# Patient Record
Sex: MALE | Race: BLACK OR AFRICAN AMERICAN | Employment: OTHER | URBAN - METROPOLITAN AREA
[De-identification: names, ages, dates, MRNs, and addresses within clinical notes are randomized per-mention and may not be internally consistent; named-entity substitution may affect disease eponyms.]

---

## 2018-09-25 ENCOUNTER — LAB ENCOUNTER (OUTPATIENT)
Dept: LAB | Age: 43
End: 2018-09-25
Attending: INTERNAL MEDICINE
Payer: COMMERCIAL

## 2018-09-25 ENCOUNTER — OFFICE VISIT (OUTPATIENT)
Dept: INTERNAL MEDICINE CLINIC | Facility: CLINIC | Age: 43
End: 2018-09-25
Payer: COMMERCIAL

## 2018-09-25 VITALS
SYSTOLIC BLOOD PRESSURE: 142 MMHG | TEMPERATURE: 99 F | DIASTOLIC BLOOD PRESSURE: 84 MMHG | WEIGHT: 209 LBS | HEIGHT: 66.25 IN | HEART RATE: 80 BPM | BODY MASS INDEX: 33.59 KG/M2

## 2018-09-25 DIAGNOSIS — E66.09 CLASS 1 OBESITY DUE TO EXCESS CALORIES WITHOUT SERIOUS COMORBIDITY WITH BODY MASS INDEX (BMI) OF 33.0 TO 33.9 IN ADULT: ICD-10-CM

## 2018-09-25 DIAGNOSIS — Z23 NEED FOR TETANUS, DIPHTHERIA, AND ACELLULAR PERTUSSIS (TDAP) VACCINE: ICD-10-CM

## 2018-09-25 DIAGNOSIS — Z23 FLU VACCINE NEED: ICD-10-CM

## 2018-09-25 DIAGNOSIS — Z00.00 ROUTINE GENERAL MEDICAL EXAMINATION AT A HEALTH CARE FACILITY: ICD-10-CM

## 2018-09-25 DIAGNOSIS — R03.0 ELEVATED BLOOD PRESSURE READING: ICD-10-CM

## 2018-09-25 DIAGNOSIS — Z00.00 ROUTINE GENERAL MEDICAL EXAMINATION AT A HEALTH CARE FACILITY: Primary | ICD-10-CM

## 2018-09-25 LAB
ALBUMIN SERPL-MCNC: 4 G/DL (ref 3.5–4.8)
ALBUMIN/GLOB SERPL: 1 {RATIO} (ref 1–2)
ALP LIVER SERPL-CCNC: 62 U/L (ref 45–117)
ALT SERPL-CCNC: 71 U/L (ref 17–63)
ANION GAP SERPL CALC-SCNC: 7 MMOL/L (ref 0–18)
AST SERPL-CCNC: 25 U/L (ref 15–41)
BASOPHILS # BLD AUTO: 0.05 X10(3) UL (ref 0–0.1)
BASOPHILS NFR BLD AUTO: 0.7 %
BILIRUB SERPL-MCNC: 0.3 MG/DL (ref 0.1–2)
BILIRUB UR QL STRIP.AUTO: NEGATIVE
BUN BLD-MCNC: 16 MG/DL (ref 8–20)
BUN/CREAT SERPL: 12 (ref 10–20)
CALCIUM BLD-MCNC: 9.2 MG/DL (ref 8.3–10.3)
CHLORIDE SERPL-SCNC: 105 MMOL/L (ref 101–111)
CHOLEST SMN-MCNC: 173 MG/DL (ref ?–200)
CO2 SERPL-SCNC: 26 MMOL/L (ref 22–32)
COLOR UR AUTO: YELLOW
COMPLEXED PSA SERPL-MCNC: 0.98 NG/ML (ref 0.01–4)
CREAT BLD-MCNC: 1.33 MG/DL (ref 0.7–1.3)
EOSINOPHIL # BLD AUTO: 0.04 X10(3) UL (ref 0–0.3)
EOSINOPHIL NFR BLD AUTO: 0.6 %
ERYTHROCYTE [DISTWIDTH] IN BLOOD BY AUTOMATED COUNT: 12.7 % (ref 11.5–16)
EST. AVERAGE GLUCOSE BLD GHB EST-MCNC: 120 MG/DL (ref 68–126)
GLOBULIN PLAS-MCNC: 3.9 G/DL (ref 2.5–4)
GLUCOSE BLD-MCNC: 90 MG/DL (ref 70–99)
GLUCOSE UR STRIP.AUTO-MCNC: NEGATIVE MG/DL
HBA1C MFR BLD HPLC: 5.8 % (ref ?–5.7)
HCT VFR BLD AUTO: 46.9 % (ref 37–53)
HDLC SERPL-MCNC: 41 MG/DL (ref 40–59)
HGB BLD-MCNC: 15.2 G/DL (ref 13–17)
IMMATURE GRANULOCYTE COUNT: 0.03 X10(3) UL (ref 0–1)
IMMATURE GRANULOCYTE RATIO %: 0.4 %
KETONES UR STRIP.AUTO-MCNC: NEGATIVE MG/DL
LDLC SERPL CALC-MCNC: 108 MG/DL (ref ?–100)
LEUKOCYTE ESTERASE UR QL STRIP.AUTO: NEGATIVE
LYMPHOCYTES # BLD AUTO: 1.94 X10(3) UL (ref 0.9–4)
LYMPHOCYTES NFR BLD AUTO: 27 %
M PROTEIN MFR SERPL ELPH: 7.9 G/DL (ref 6.1–8.3)
MCH RBC QN AUTO: 30.6 PG (ref 27–33.2)
MCHC RBC AUTO-ENTMCNC: 32.4 G/DL (ref 31–37)
MCV RBC AUTO: 94.6 FL (ref 80–99)
MONOCYTES # BLD AUTO: 0.66 X10(3) UL (ref 0.1–1)
MONOCYTES NFR BLD AUTO: 9.2 %
NEUTROPHIL ABS PRELIM: 4.46 X10 (3) UL (ref 1.3–6.7)
NEUTROPHILS # BLD AUTO: 4.46 X10(3) UL (ref 1.3–6.7)
NEUTROPHILS NFR BLD AUTO: 62.1 %
NITRITE UR QL STRIP.AUTO: NEGATIVE
NONHDLC SERPL-MCNC: 132 MG/DL (ref ?–130)
OSMOLALITY SERPL CALC.SUM OF ELEC: 287 MOSM/KG (ref 275–295)
PH UR STRIP.AUTO: 7 [PH] (ref 4.5–8)
PLATELET # BLD AUTO: 399 10(3)UL (ref 150–450)
POTASSIUM SERPL-SCNC: 4.3 MMOL/L (ref 3.6–5.1)
PROT UR STRIP.AUTO-MCNC: NEGATIVE MG/DL
RBC # BLD AUTO: 4.96 X10(6)UL (ref 4.3–5.7)
RBC UR QL AUTO: NEGATIVE
RED CELL DISTRIBUTION WIDTH-SD: 43.9 FL (ref 35.1–46.3)
SODIUM SERPL-SCNC: 138 MMOL/L (ref 136–144)
SP GR UR STRIP.AUTO: 1.02 (ref 1–1.03)
TRIGL SERPL-MCNC: 118 MG/DL (ref 30–149)
TSI SER-ACNC: 2.5 MIU/ML (ref 0.35–5.5)
UROBILINOGEN UR STRIP.AUTO-MCNC: <2 MG/DL
VLDLC SERPL CALC-MCNC: 24 MG/DL (ref 0–30)
WBC # BLD AUTO: 7.2 X10(3) UL (ref 4–13)

## 2018-09-25 PROCEDURE — 80050 GENERAL HEALTH PANEL: CPT | Performed by: INTERNAL MEDICINE

## 2018-09-25 PROCEDURE — 84153 ASSAY OF PSA TOTAL: CPT | Performed by: INTERNAL MEDICINE

## 2018-09-25 PROCEDURE — 90472 IMMUNIZATION ADMIN EACH ADD: CPT | Performed by: INTERNAL MEDICINE

## 2018-09-25 PROCEDURE — 80061 LIPID PANEL: CPT | Performed by: INTERNAL MEDICINE

## 2018-09-25 PROCEDURE — 81003 URINALYSIS AUTO W/O SCOPE: CPT | Performed by: INTERNAL MEDICINE

## 2018-09-25 PROCEDURE — 90471 IMMUNIZATION ADMIN: CPT | Performed by: INTERNAL MEDICINE

## 2018-09-25 PROCEDURE — 90686 IIV4 VACC NO PRSV 0.5 ML IM: CPT | Performed by: INTERNAL MEDICINE

## 2018-09-25 PROCEDURE — 99386 PREV VISIT NEW AGE 40-64: CPT | Performed by: INTERNAL MEDICINE

## 2018-09-25 PROCEDURE — 90715 TDAP VACCINE 7 YRS/> IM: CPT | Performed by: INTERNAL MEDICINE

## 2018-09-25 PROCEDURE — 36415 COLL VENOUS BLD VENIPUNCTURE: CPT | Performed by: INTERNAL MEDICINE

## 2018-09-25 PROCEDURE — 83036 HEMOGLOBIN GLYCOSYLATED A1C: CPT | Performed by: INTERNAL MEDICINE

## 2018-09-25 RX ORDER — ASCORBIC ACID 500 MG
TABLET ORAL
COMMUNITY

## 2018-09-25 NOTE — PROGRESS NOTES
Patient presents with:  CPX: not fasting      HPI: Joel Fisher is a 44 y/o AAM, new patient, here to establish PCP and to undergo the male CPX. Health goals center around longevity, vitality, and healthy life/quality of life. He's also about weight loss.     R pressure reading    1. Male CPX - Stable health at 1st visit w/ me. Check wellness labs. 2. HM/Prev - Boostrix and Flu shots given. 3. Obesity - Keep pushing lifestyle measures. 4. Elevated BP reading - 1st visit w/ me. Push lifestyle measures.     5.

## 2018-09-28 PROBLEM — R79.89 ELEVATED SERUM CREATININE: Status: ACTIVE | Noted: 2018-09-28

## 2018-09-28 PROBLEM — R73.03 PREDIABETES: Status: ACTIVE | Noted: 2018-09-28

## 2018-09-28 PROBLEM — R74.01 ELEVATED ALT MEASUREMENT: Status: ACTIVE | Noted: 2018-09-28

## 2018-12-17 ENCOUNTER — OFFICE VISIT (OUTPATIENT)
Dept: INTERNAL MEDICINE CLINIC | Facility: CLINIC | Age: 43
End: 2018-12-17
Payer: COMMERCIAL

## 2018-12-17 VITALS
TEMPERATURE: 99 F | OXYGEN SATURATION: 95 % | SYSTOLIC BLOOD PRESSURE: 148 MMHG | DIASTOLIC BLOOD PRESSURE: 98 MMHG | RESPIRATION RATE: 16 BRPM | HEIGHT: 66.25 IN | BODY MASS INDEX: 32.75 KG/M2 | WEIGHT: 203.75 LBS | HEART RATE: 82 BPM

## 2018-12-17 DIAGNOSIS — I10 ESSENTIAL HYPERTENSION: Primary | ICD-10-CM

## 2018-12-17 PROBLEM — R03.0 ELEVATED BLOOD PRESSURE READING: Status: RESOLVED | Noted: 2018-09-25 | Resolved: 2018-12-17

## 2018-12-17 PROCEDURE — 99214 OFFICE O/P EST MOD 30 MIN: CPT | Performed by: INTERNAL MEDICINE

## 2018-12-17 RX ORDER — LOSARTAN POTASSIUM 50 MG/1
50 TABLET ORAL DAILY
Qty: 90 TABLET | Refills: 1 | Status: SHIPPED | OUTPATIENT
Start: 2018-12-17 | End: 2020-01-02

## 2018-12-17 NOTE — PROGRESS NOTES
Patient presents with: Follow - Up: 3 month follow up for b/p      HPI: Stevie Cao presents today for 3-month f/u elevated BP w/o the dx of HTN. He's been working on lifestyle measures. Not checking home BPs.     Review of Systems   All other systems reviewe by mouth daily.        Imaging & Consults:  CARD TREADMILL STRESS, ADULT (CPT=68308)

## 2018-12-17 NOTE — PATIENT INSTRUCTIONS
Dell,    1. Start taking medication called LOSARTAN 50 milligrams once per day to lower the blood pressure. 2. I'm fine with you going to your local pharmacy to check your blood pressure #s once every other week.   Alternatively, get yourself a home test may wear a gown. During your test  Here is what to expect at the time of the test:   · Electrodes (small pads) are placed on your upper body and a blood pressure cuff on your arm.  These are used to monitor your heartbeat and blood pressure during and afte

## 2020-01-02 RX ORDER — LOSARTAN POTASSIUM 50 MG/1
TABLET ORAL
Qty: 30 TABLET | Refills: 0 | Status: SHIPPED | OUTPATIENT
Start: 2020-01-02 | End: 2020-02-03

## 2020-01-02 NOTE — TELEPHONE ENCOUNTER
Losartan 50 mg 1 tab daily filled 12-17-18 90 with 1 refill     LOV 12-17-18    RTC 1 month for HTN f/u. No upcoming apt on file   Labs 9-25-18     Called unable leave message due for OV for further refills.

## 2020-02-03 RX ORDER — LOSARTAN POTASSIUM 50 MG/1
TABLET ORAL
Qty: 30 TABLET | Refills: 0 | Status: SHIPPED | OUTPATIENT
Start: 2020-02-03 | End: 2020-03-03

## 2020-02-03 NOTE — TELEPHONE ENCOUNTER
LOSARTAN POTASSIUM 50 MG     Last OV relevant to medication: 12-    Last refill date: 1-- # 30 tabs with 0 refills     When pt was asked to return for OV: 1 month     Upcoming appt/reason: 2-4-2020    Labs: 9-- lipid       Start taking

## 2020-02-04 ENCOUNTER — OFFICE VISIT (OUTPATIENT)
Dept: INTERNAL MEDICINE CLINIC | Facility: CLINIC | Age: 45
End: 2020-02-04
Payer: COMMERCIAL

## 2020-02-04 VITALS
RESPIRATION RATE: 16 BRPM | TEMPERATURE: 100 F | SYSTOLIC BLOOD PRESSURE: 136 MMHG | BODY MASS INDEX: 31.21 KG/M2 | WEIGHT: 196.5 LBS | HEIGHT: 66.5 IN | OXYGEN SATURATION: 96 % | DIASTOLIC BLOOD PRESSURE: 82 MMHG | HEART RATE: 95 BPM

## 2020-02-04 DIAGNOSIS — Z12.5 SCREENING PSA (PROSTATE SPECIFIC ANTIGEN): ICD-10-CM

## 2020-02-04 DIAGNOSIS — Z00.00 ROUTINE GENERAL MEDICAL EXAMINATION AT A HEALTH CARE FACILITY: Primary | ICD-10-CM

## 2020-02-04 DIAGNOSIS — Z13.6 SCREENING FOR HEART DISEASE: ICD-10-CM

## 2020-02-04 PROBLEM — E66.09 CLASS 1 OBESITY DUE TO EXCESS CALORIES WITHOUT SERIOUS COMORBIDITY WITH BODY MASS INDEX (BMI) OF 31.0 TO 31.9 IN ADULT: Status: ACTIVE | Noted: 2018-09-25

## 2020-02-04 PROCEDURE — 99396 PREV VISIT EST AGE 40-64: CPT | Performed by: INTERNAL MEDICINE

## 2020-02-04 RX ORDER — CHLORAL HYDRATE 500 MG
1 CAPSULE ORAL DAILY
COMMUNITY
Start: 2019-12-27

## 2020-02-04 NOTE — PATIENT INSTRUCTIONS
Dell,    1. Get labs done. 2. Get Heart Scan done. 3. Check out [facility]" on [address]. This movie will change your life! 4. Great seeing you today!   For more awesome information about health & wellness, check out my weekly radio show [email or web address]

## 2020-02-04 NOTE — PROGRESS NOTES
Patient presents with:  CPX      HPI: Victor Manuel Claros presents today for male CPX. Stable health. Due for wellness labs. Health goals center around longevity, vitality, and QOL. Review of Systems   All other systems reviewed and are negative.     Patient Activ B  Neck - supple, no JVD, no thyromegaly  Lymph - no palp LAD  Lungs - CTAB  CV - RRR, nl s1, s2  Abd - soft, NABS, NT, ND  Ext - no c/c/e  Neuro - CNs 2-12 grossly intact, no focal deficits; 2+ DTRs  Psych - nl mood/affect       A/P:  Routine general medi

## 2020-02-05 ENCOUNTER — TELEPHONE (OUTPATIENT)
Dept: INTERNAL MEDICINE CLINIC | Facility: CLINIC | Age: 45
End: 2020-02-05

## 2020-02-05 NOTE — TELEPHONE ENCOUNTER
Order discontinued. LVM for [facility] to call office back to notify to discontinue order. [facility] does not answer phones. Pt notified of order cancellation.

## 2020-02-05 NOTE — TELEPHONE ENCOUNTER
Patient calling in, stated he went to [email or web address] (in [place] [address])  this morning and he completed his blood work, except the vitamin D, because the diagnoses code was incorrect, and they needed an additional code.

## 2020-02-05 NOTE — TELEPHONE ENCOUNTER
Have them cancel the Vitamin D outright. Roslyn Issa. Patsy Del Rosario MD  Diplomate, American Board of Internal Medicine  Member, American College of [address] [facility]  [address] [facility],[address], [address], [facility], [address] [place] [address]  T: [id number]; F: 63

## 2020-02-06 LAB
ABSOLUTE BASOPHILS: 51 CELLS/UL (ref 0–200)
ABSOLUTE EOSINOPHILS: 80 CELLS/UL (ref 15–500)
ABSOLUTE LYMPHOCYTES: 1938 CELLS/UL (ref 850–3900)
ABSOLUTE MONOCYTES: 439 CELLS/UL (ref 200–950)
ABSOLUTE NEUTROPHILS: 3192 CELLS/UL (ref 1500–7800)
ALBUMIN/GLOBULIN RATIO: 1.6 (CALC) (ref 1–2.5)
ALBUMIN: 4.6 G/DL (ref 3.6–5.1)
ALKALINE PHOSPHATASE: 51 U/L (ref 36–130)
ALT: 36 U/L (ref 9–46)
APPEARANCE: CLEAR
AST: 18 U/L (ref 10–40)
BASOPHILS: 0.9 %
BILIRUBIN, TOTAL: 0.5 MG/DL (ref 0.2–1.2)
BILIRUBIN: NEGATIVE
BUN/CREATININE RATIO: 10 (CALC) (ref 6–22)
BUN: 14 MG/DL (ref 7–25)
CALCIUM: 9.9 MG/DL (ref 8.6–10.3)
CARBON DIOXIDE: 28 MMOL/L (ref 20–32)
CHLORIDE: 104 MMOL/L (ref 98–110)
CHOL/HDLC RATIO: 4.2 (CALC)
CHOLESTEROL, TOTAL: 180 MG/DL
COLOR: YELLOW
CREATININE: 1.46 MG/DL (ref 0.6–1.35)
EGFR IF AFRICN AM: 67 ML/MIN/1.73M2
EGFR IF NONAFRICN AM: 58 ML/MIN/1.73M2
EOSINOPHILS: 1.4 %
GLOBULIN: 2.8 G/DL (CALC) (ref 1.9–3.7)
GLUCOSE: 99 MG/DL (ref 65–99)
GLUCOSE: NEGATIVE
HDL CHOLESTEROL: 43 MG/DL
HEMATOCRIT: 45.8 % (ref 38.5–50)
HEMOGLOBIN A1C: 5.7 % OF TOTAL HGB
HEMOGLOBIN: 16.3 G/DL (ref 13.2–17.1)
KETONES: NEGATIVE
LDL-CHOLESTEROL: 117 MG/DL (CALC)
LEUKOCYTE ESTERASE: NEGATIVE
LYMPHOCYTES: 34 %
MCH: 32.4 PG (ref 27–33)
MCHC: 35.6 G/DL (ref 32–36)
MCV: 91.1 FL (ref 80–100)
MONOCYTES: 7.7 %
MPV: 9.9 FL (ref 7.5–12.5)
NEUTROPHILS: 56 %
NITRITE: NEGATIVE
NON-HDL CHOLESTEROL: 137 MG/DL (CALC)
OCCULT BLOOD: NEGATIVE
PH: 7.5 (ref 5–8)
PLATELET COUNT: 411 THOUSAND/UL (ref 140–400)
POTASSIUM: 4.8 MMOL/L (ref 3.5–5.3)
PROTEIN, TOTAL: 7.4 G/DL (ref 6.1–8.1)
PROTEIN: NEGATIVE
PSA, TOTAL: 0.4 NG/ML
RDW: 12.8 % (ref 11–15)
RED BLOOD CELL COUNT: 5.03 MILLION/UL (ref 4.2–5.8)
SODIUM: 139 MMOL/L (ref 135–146)
SPECIFIC GRAVITY: 1.02 (ref 1–1.03)
TRIGLYCERIDES: 98 MG/DL
TSH W/REFLEX TO FT4: 1.39 MIU/L (ref 0.4–4.5)
WHITE BLOOD CELL COUNT: 5.7 THOUSAND/UL (ref 3.8–10.8)

## 2020-02-29 ENCOUNTER — HOSPITAL ENCOUNTER (OUTPATIENT)
Dept: CT IMAGING | Facility: HOSPITAL | Age: 45
Discharge: HOME | End: 2020-02-29
Attending: INTERNAL MEDICINE

## 2020-02-29 DIAGNOSIS — Z13.6 SCREENING FOR CARDIOVASCULAR CONDITION: ICD-10-CM

## 2020-03-03 RX ORDER — LOSARTAN POTASSIUM 50 MG/1
TABLET ORAL
Qty: 90 TABLET | Refills: 3 | Status: SHIPPED | OUTPATIENT
Start: 2020-03-03 | End: 2021-03-08

## 2020-03-03 NOTE — TELEPHONE ENCOUNTER
LOSARTAN POTASSIUM 50 MG - passed protocol    Last OV relevant to medication: 2-4-2020    Last refill date: 2-3-2020 #30 tabs with 0 refills     When pt was asked to return for OV: 1 year     Upcoming appt/reason: none scheduled     Labs: 2-5-2020-lipid

## 2020-03-23 ENCOUNTER — OFFICE VISIT (OUTPATIENT)
Dept: NEPHROLOGY | Facility: CLINIC | Age: 45
End: 2020-03-23
Payer: COMMERCIAL

## 2020-03-23 VITALS — BODY MASS INDEX: 33 KG/M2 | SYSTOLIC BLOOD PRESSURE: 130 MMHG | WEIGHT: 206.81 LBS | DIASTOLIC BLOOD PRESSURE: 84 MMHG

## 2020-03-23 DIAGNOSIS — R79.89 ELEVATED SERUM CREATININE: Primary | ICD-10-CM

## 2020-03-23 PROCEDURE — 99203 OFFICE O/P NEW LOW 30 MIN: CPT | Performed by: INTERNAL MEDICINE

## 2020-03-23 NOTE — PROGRESS NOTES
Nephrology Consult Note    REASON FOR CONSULT: Elevated Creatinine    ASSESSMENT/PLAN:        1) Elevated creatinine- serum creatinine 1.3 to 1.4 mg/dL since initial labs in 2018 most likely reflects generous muscle mass and high protein intake with preser change in urinary habits or gross hematuria  Denies LE edema  Denies skin rashes/myalgias/arthralgias    PMH:  Past Medical History:   Diagnosis Date   • Allergic rhinitis        PSH:  Past Surgical History:   Procedure Laterality Date   • VASECTOMY  1999 normal affect, cranial nerves grossly intact, moving all extremities  Skin: Warm and dry, no rashes        Jones Montez MD  3/23/2020  2:16 PM

## 2020-03-30 ENCOUNTER — TELEPHONE (OUTPATIENT)
Dept: NEPHROLOGY | Facility: CLINIC | Age: 45
End: 2020-03-30

## 2020-03-30 NOTE — TELEPHONE ENCOUNTER
Left VM for pt- 24 hr urine for CrCl = 120 cc/min- normal renal function + normal UA-> no further w/u; focus on BP mgmt- mónicax raymundo

## 2021-02-06 DIAGNOSIS — Z23 NEED FOR VACCINATION: ICD-10-CM

## 2021-03-03 RX ORDER — LOSARTAN POTASSIUM 50 MG/1
TABLET ORAL
Qty: 30 TABLET | Refills: 0 | OUTPATIENT
Start: 2021-03-03

## 2021-03-03 RX ORDER — LOSARTAN POTASSIUM 50 MG/1
50 TABLET ORAL DAILY
Qty: 90 TABLET | Refills: 3 | Status: CANCELLED | OUTPATIENT
Start: 2021-03-03

## 2021-03-03 NOTE — TELEPHONE ENCOUNTER
Patient has not seen Dr. West Magaña since 2/2020 and has upcoming appt with new PCP, Reginaldo Hernández on 3/8.

## 2021-03-03 NOTE — TELEPHONE ENCOUNTER
Failed protocol     Last refill:  3/3/2020 Losartan 50 mg #90 3R    LOV:   2/4/2020 Dr West Magaña RTC 1 year  No FOV scheduled

## 2021-05-25 ENCOUNTER — HOSPITAL ENCOUNTER (OUTPATIENT)
Age: 46
Discharge: HOME | End: 2021-05-25
Payer: COMMERCIAL

## 2021-05-25 VITALS
RESPIRATION RATE: 16 BRPM | TEMPERATURE: 98 F | DIASTOLIC BLOOD PRESSURE: 85 MMHG | HEART RATE: 95 BPM | SYSTOLIC BLOOD PRESSURE: 133 MMHG | OXYGEN SATURATION: 97 %

## 2021-05-25 DIAGNOSIS — S05.01XA ABRASION OF RIGHT CORNEA, INITIAL ENCOUNTER: Primary | ICD-10-CM

## 2021-05-25 PROCEDURE — 99203 OFFICE O/P NEW LOW 30 MIN: CPT | Performed by: PHYSICIAN ASSISTANT

## 2021-05-25 RX ORDER — ERYTHROMYCIN 5 MG/G
1 OINTMENT OPHTHALMIC EVERY 6 HOURS
Qty: 1 G | Refills: 0 | Status: SHIPPED | OUTPATIENT
Start: 2021-05-25 | End: 2021-06-01

## 2021-05-25 RX ORDER — TETRACAINE HYDROCHLORIDE 5 MG/ML
1 SOLUTION OPHTHALMIC ONCE
Status: COMPLETED | OUTPATIENT
Start: 2021-05-25 | End: 2021-05-25

## 2021-05-25 NOTE — ED PROVIDER NOTES
Patient Seen in: [facility] [address] [place] [address]      History   Patient presents with: Eye Visual Problem    Stated Complaint: eye problem    HPI/Subjective:   HPI    77-year-old male presents with right eye irritation sensation since yesterday.   Patient states h reactive to light. Right eye: Corneal abrasion (12 o'clock, large) and fluorescein uptake present. Cardiovascular:      Rate and Rhythm: Normal rate.    Pulmonary:      Effort: Pulmonary effort is normal.   Musculoskeletal:      Cervical back: Normal

## 2021-05-25 NOTE — ED INITIAL ASSESSMENT (HPI)
Pt states was at work yesterday when he got something in his eye. He rinsed after and felt better.   Today woke up with redness and pain

## 2021-08-06 NOTE — TELEPHONE ENCOUNTER
Ok to add code:    -encounter for vitamin deficiency screening ICD10 Z13.21    Needs to be added and attached to the office visit encounter. Patient Instructions by Paras Wylie [email or web address]    5047-3117 [employer]. [address] [place] [address]. All rights reserved. This information is not intended as a substitute for professional medical care. Always follow your healthcare professional's instructions.           Patient Education   Signs of Hearing Loss  Hearing loss is a problem shared by many people. In fact, it is one of the most common health conditions, particularly as people age. Most people over age 65 have some hearing loss, and by age 80, almost everyone does. Because hearing loss usually occurs slowly over the years, you may not realize your hearing ability has gotten worse.       Have your hearing checked  Contact your [facility] care provider if you:    Have to strain to hear normal conversation.    Have to watch other peoples faces very carefully to follow what theyre saying.    Need to ask people to repeat what theyve said.    Often misunderstand what people are saying.    Turn the volume of the television or radio up so high that others complain.    Feel that people are mumbling when theyre talking to you.    Find that the effort to hear leaves you feeling tired  and irritated.    Notice, when using the phone, that you hear better with 1 ear than the other.    [id number] [employer]. [address] [place] [address], [place] [address]. 